# Patient Record
Sex: FEMALE | Race: OTHER | HISPANIC OR LATINO | URBAN - METROPOLITAN AREA
[De-identification: names, ages, dates, MRNs, and addresses within clinical notes are randomized per-mention and may not be internally consistent; named-entity substitution may affect disease eponyms.]

---

## 2019-02-15 ENCOUNTER — INPATIENT (INPATIENT)
Facility: HOSPITAL | Age: 29
LOS: 2 days | Discharge: HOME | End: 2019-02-18
Attending: OBSTETRICS & GYNECOLOGY | Admitting: OBSTETRICS & GYNECOLOGY
Payer: MEDICAID

## 2019-02-15 DIAGNOSIS — O26.899 OTHER SPECIFIED PREGNANCY RELATED CONDITIONS, UNSPECIFIED TRIMESTER: ICD-10-CM

## 2019-02-15 DIAGNOSIS — Z3A.00 WEEKS OF GESTATION OF PREGNANCY NOT SPECIFIED: ICD-10-CM

## 2019-02-16 VITALS — WEIGHT: 194.01 LBS | HEIGHT: 64 IN

## 2019-02-16 DIAGNOSIS — Z34.80 ENCOUNTER FOR SUPERVISION OF OTHER NORMAL PREGNANCY, UNSPECIFIED TRIMESTER: ICD-10-CM

## 2019-02-16 LAB
APTT BLD: 26.3 SEC — LOW (ref 27.5–36.3)
BASOPHILS # BLD AUTO: 0.01 K/UL — SIGNIFICANT CHANGE UP (ref 0–0.2)
BASOPHILS NFR BLD AUTO: 0.1 % — SIGNIFICANT CHANGE UP (ref 0–2)
EOSINOPHIL # BLD AUTO: 0 K/UL — SIGNIFICANT CHANGE UP (ref 0–0.5)
EOSINOPHIL NFR BLD AUTO: 0 % — SIGNIFICANT CHANGE UP (ref 0–6)
FIBRINOGEN PPP-MCNC: 934 MG/DL — HIGH (ref 350–510)
HBV SURFACE AG SERPL QL IA: SIGNIFICANT CHANGE UP
HCT VFR BLD CALC: 36.6 % — SIGNIFICANT CHANGE UP (ref 34.5–45)
HGB BLD-MCNC: 11.6 G/DL — SIGNIFICANT CHANGE UP (ref 11.5–15.5)
HIV 1 & 2 AB SERPL IA.RAPID: SIGNIFICANT CHANGE UP
HIV 1+2 AB+HIV1 P24 AG SERPL QL IA: SIGNIFICANT CHANGE UP
IMM GRANULOCYTES NFR BLD AUTO: 0.4 % — SIGNIFICANT CHANGE UP (ref 0–1.5)
INR BLD: 0.92 RATIO — SIGNIFICANT CHANGE UP (ref 0.88–1.16)
LYMPHOCYTES # BLD AUTO: 0.64 K/UL — LOW (ref 1–3.3)
LYMPHOCYTES # BLD AUTO: 7.8 % — LOW (ref 13–44)
MCHC RBC-ENTMCNC: 27.4 PG — SIGNIFICANT CHANGE UP (ref 27–34)
MCHC RBC-ENTMCNC: 31.7 GM/DL — LOW (ref 32–36)
MCV RBC AUTO: 86.3 FL — SIGNIFICANT CHANGE UP (ref 80–100)
MONOCYTES # BLD AUTO: 0.25 K/UL — SIGNIFICANT CHANGE UP (ref 0–0.9)
MONOCYTES NFR BLD AUTO: 3 % — SIGNIFICANT CHANGE UP (ref 2–14)
NEUTROPHILS # BLD AUTO: 7.27 K/UL — SIGNIFICANT CHANGE UP (ref 1.8–7.4)
NEUTROPHILS NFR BLD AUTO: 88.7 % — HIGH (ref 43–77)
NRBC # BLD: 0 /100 WBCS — SIGNIFICANT CHANGE UP (ref 0–0)
PLATELET # BLD AUTO: 227 K/UL — SIGNIFICANT CHANGE UP (ref 150–400)
PROTHROM AB SERPL-ACNC: 10.2 SEC — SIGNIFICANT CHANGE UP (ref 10–12.9)
RBC # BLD: 4.24 M/UL — SIGNIFICANT CHANGE UP (ref 3.8–5.2)
RBC # FLD: 13.9 % — SIGNIFICANT CHANGE UP (ref 10.3–14.5)
RUBV IGG SER-ACNC: 1.5 INDEX — SIGNIFICANT CHANGE UP
RUBV IGG SER-IMP: POSITIVE — SIGNIFICANT CHANGE UP
T PALLIDUM AB TITR SER: NEGATIVE — SIGNIFICANT CHANGE UP
WBC # BLD: 8.2 K/UL — SIGNIFICANT CHANGE UP (ref 3.8–10.5)
WBC # FLD AUTO: 8.2 K/UL — SIGNIFICANT CHANGE UP (ref 3.8–10.5)

## 2019-02-16 RX ORDER — SODIUM CHLORIDE 9 MG/ML
1000 INJECTION, SOLUTION INTRAVENOUS ONCE
Qty: 0 | Refills: 0 | Status: DISCONTINUED | OUTPATIENT
Start: 2019-02-16 | End: 2019-02-16

## 2019-02-16 RX ORDER — OXYCODONE AND ACETAMINOPHEN 5; 325 MG/1; MG/1
2 TABLET ORAL EVERY 6 HOURS
Qty: 0 | Refills: 0 | Status: DISCONTINUED | OUTPATIENT
Start: 2019-02-16 | End: 2019-02-18

## 2019-02-16 RX ORDER — SODIUM CHLORIDE 9 MG/ML
3 INJECTION INTRAMUSCULAR; INTRAVENOUS; SUBCUTANEOUS EVERY 8 HOURS
Qty: 0 | Refills: 0 | Status: DISCONTINUED | OUTPATIENT
Start: 2019-02-16 | End: 2019-02-18

## 2019-02-16 RX ORDER — LANOLIN
1 OINTMENT (GRAM) TOPICAL EVERY 6 HOURS
Qty: 0 | Refills: 0 | Status: DISCONTINUED | OUTPATIENT
Start: 2019-02-16 | End: 2019-02-18

## 2019-02-16 RX ORDER — AER TRAVELER 0.5 G/1
1 SOLUTION RECTAL; TOPICAL EVERY 4 HOURS
Qty: 0 | Refills: 0 | Status: DISCONTINUED | OUTPATIENT
Start: 2019-02-16 | End: 2019-02-18

## 2019-02-16 RX ORDER — MAGNESIUM HYDROXIDE 400 MG/1
30 TABLET, CHEWABLE ORAL
Qty: 0 | Refills: 0 | Status: DISCONTINUED | OUTPATIENT
Start: 2019-02-16 | End: 2019-02-17

## 2019-02-16 RX ORDER — SODIUM CHLORIDE 9 MG/ML
2000 INJECTION, SOLUTION INTRAVENOUS ONCE
Qty: 0 | Refills: 0 | Status: COMPLETED | OUTPATIENT
Start: 2019-02-16 | End: 2019-02-16

## 2019-02-16 RX ORDER — OXYCODONE AND ACETAMINOPHEN 5; 325 MG/1; MG/1
1 TABLET ORAL
Qty: 0 | Refills: 0 | Status: DISCONTINUED | OUTPATIENT
Start: 2019-02-16 | End: 2019-02-18

## 2019-02-16 RX ORDER — DIPHENHYDRAMINE HCL 50 MG
25 CAPSULE ORAL EVERY 6 HOURS
Qty: 0 | Refills: 0 | Status: DISCONTINUED | OUTPATIENT
Start: 2019-02-16 | End: 2019-02-18

## 2019-02-16 RX ORDER — DOCUSATE SODIUM 100 MG
100 CAPSULE ORAL
Qty: 0 | Refills: 0 | Status: DISCONTINUED | OUTPATIENT
Start: 2019-02-16 | End: 2019-02-18

## 2019-02-16 RX ORDER — PRAMOXINE HYDROCHLORIDE 150 MG/15G
1 AEROSOL, FOAM RECTAL EVERY 4 HOURS
Qty: 0 | Refills: 0 | Status: DISCONTINUED | OUTPATIENT
Start: 2019-02-16 | End: 2019-02-18

## 2019-02-16 RX ORDER — SODIUM CHLORIDE 9 MG/ML
1000 INJECTION, SOLUTION INTRAVENOUS
Qty: 0 | Refills: 0 | Status: DISCONTINUED | OUTPATIENT
Start: 2019-02-16 | End: 2019-02-16

## 2019-02-16 RX ORDER — TETANUS TOXOID, REDUCED DIPHTHERIA TOXOID AND ACELLULAR PERTUSSIS VACCINE, ADSORBED 5; 2.5; 8; 8; 2.5 [IU]/.5ML; [IU]/.5ML; UG/.5ML; UG/.5ML; UG/.5ML
0.5 SUSPENSION INTRAMUSCULAR ONCE
Qty: 0 | Refills: 0 | Status: DISCONTINUED | OUTPATIENT
Start: 2019-02-16 | End: 2019-02-18

## 2019-02-16 RX ORDER — SIMETHICONE 80 MG/1
80 TABLET, CHEWABLE ORAL EVERY 6 HOURS
Qty: 0 | Refills: 0 | Status: DISCONTINUED | OUTPATIENT
Start: 2019-02-16 | End: 2019-02-18

## 2019-02-16 RX ORDER — IBUPROFEN 200 MG
600 TABLET ORAL EVERY 6 HOURS
Qty: 0 | Refills: 0 | Status: DISCONTINUED | OUTPATIENT
Start: 2019-02-16 | End: 2019-02-18

## 2019-02-16 RX ORDER — HYDROCORTISONE 1 %
1 OINTMENT (GRAM) TOPICAL EVERY 4 HOURS
Qty: 0 | Refills: 0 | Status: DISCONTINUED | OUTPATIENT
Start: 2019-02-16 | End: 2019-02-18

## 2019-02-16 RX ORDER — OXYTOCIN 10 UNIT/ML
41.67 VIAL (ML) INJECTION
Qty: 20 | Refills: 0 | Status: DISCONTINUED | OUTPATIENT
Start: 2019-02-16 | End: 2019-02-18

## 2019-02-16 RX ORDER — OXYTOCIN 10 UNIT/ML
333.33 VIAL (ML) INJECTION
Qty: 20 | Refills: 0 | Status: DISCONTINUED | OUTPATIENT
Start: 2019-02-16 | End: 2019-02-16

## 2019-02-16 RX ORDER — CITRIC ACID/SODIUM CITRATE 300-500 MG
15 SOLUTION, ORAL ORAL EVERY 4 HOURS
Qty: 0 | Refills: 0 | Status: DISCONTINUED | OUTPATIENT
Start: 2019-02-16 | End: 2019-02-16

## 2019-02-16 RX ORDER — ACETAMINOPHEN 500 MG
650 TABLET ORAL EVERY 6 HOURS
Qty: 0 | Refills: 0 | Status: DISCONTINUED | OUTPATIENT
Start: 2019-02-16 | End: 2019-02-18

## 2019-02-16 RX ORDER — GLYCERIN ADULT
1 SUPPOSITORY, RECTAL RECTAL AT BEDTIME
Qty: 0 | Refills: 0 | Status: DISCONTINUED | OUTPATIENT
Start: 2019-02-16 | End: 2019-02-18

## 2019-02-16 RX ORDER — DIBUCAINE 1 %
1 OINTMENT (GRAM) RECTAL EVERY 4 HOURS
Qty: 0 | Refills: 0 | Status: DISCONTINUED | OUTPATIENT
Start: 2019-02-16 | End: 2019-02-18

## 2019-02-16 RX ADMIN — Medication 125 MILLIUNIT(S)/MIN: at 15:13

## 2019-02-16 RX ADMIN — SODIUM CHLORIDE 4000 MILLILITER(S): 9 INJECTION, SOLUTION INTRAVENOUS at 04:00

## 2019-02-16 RX ADMIN — SODIUM CHLORIDE 3 MILLILITER(S): 9 INJECTION INTRAMUSCULAR; INTRAVENOUS; SUBCUTANEOUS at 22:00

## 2019-02-16 NOTE — DISCHARGE NOTE OB - PATIENT PORTAL LINK FT
You can access the [facility] Patient Portal, offered by [facility], by registering with the following website: [email or web address]

## 2019-02-16 NOTE — DISCHARGE NOTE OB - CARE PROVIDER_API CALL
Margarette Mendez)  Obstetrics and Gynecology  [address] [place] [address] [address]  [place] [address]  Phone: [phone]  Fax: [phone]  Follow Up Time:     Rosa Bland)  Pediatrics  [address] [place] [address]  Phone: [phone]  Fax: [phone]  Follow Up Time:

## 2019-02-16 NOTE — DISCHARGE NOTE OB - MEDICATION SUMMARY - MEDICATIONS TO TAKE
I will START or STAY ON the medications listed below when I get home from the hospital:    ibuprofen 600 mg oral tablet  -- 1 tab(s) by mouth every 6 hours, As needed, Mild Pain (1 - 3)  -- Indication: For for pain    ferrous sulfate 325 mg (65 mg elemental iron) oral tablet  -- 1 tab(s) by mouth 3 times a day  -- Indication: For Acute blood loss anemia    Prenatal Multivitamins with Folic Acid 1 mg oral tablet  -- 1 tab(s) by mouth once a day  -- Indication: For continue daily while whole course of breastfeeding    docusate sodium 100 mg oral capsule  -- 1 cap(s) by mouth 2 times a day  -- Indication: For Stool softenr    senna oral tablet  -- 2 tab(s) by mouth once a day (at bedtime)  -- Indication: For for bowel movement

## 2019-02-16 NOTE — DISCHARGE NOTE OB - PLAN OF CARE
POSTPARTUM CARE ob check in 5weeks call office and set up appointment no sex nothing in vagina no heavy lifting no pushing no straining no strenuous activities  pain medication as needed; stool softener; dulcolax as needed if constipated  walk for exercise: helps recovery   continue prenatal vitamins daily especially whole course of breastfeeding  see your OB in the office for follow up post partum check 4-5weeks call the office to set up an appointment

## 2019-02-16 NOTE — DISCHARGE NOTE OB - CARE PLAN
Principal Discharge DX:	Labor abnormality  Goal:	  Assessment and plan of treatment:	POSTPARTUM CARE Principal Discharge DX:	Labor abnormality  Goal:	ob check in 5weeks call office and set up appointment  Assessment and plan of treatment:	no sex nothing in vagina no heavy lifting no pushing no straining no strenuous activities  pain medication as needed; stool softener; dulcolax as needed if constipated  walk for exercise: helps recovery   continue prenatal vitamins daily especially whole course of breastfeeding  see your OB in the office for follow up post partum check 4-5weeks call the office to set up an appointment

## 2019-02-16 NOTE — PATIENT PROFILE OB - NS PRO TDAP CONTRAINDICATIONS
Will follow up with PCP

## 2019-02-16 NOTE — DISCHARGE NOTE OB - MATERIALS PROVIDED
Guide to Postpartum Care/Breastfeeding Guide and Packet/Birth Certificate Instructions/[place]  Immunization Record/Bottle Feeding Log/Shaken Baby Prevention Handout/[facility] [place] Screening Program/Vaccinations/Breastfeeding Log

## 2019-02-17 DIAGNOSIS — D62 ACUTE POSTHEMORRHAGIC ANEMIA: ICD-10-CM

## 2019-02-17 LAB
BASOPHILS # BLD AUTO: 0.02 K/UL — SIGNIFICANT CHANGE UP (ref 0–0.2)
BASOPHILS NFR BLD AUTO: 0.2 % — SIGNIFICANT CHANGE UP (ref 0–2)
EOSINOPHIL # BLD AUTO: 0.01 K/UL — SIGNIFICANT CHANGE UP (ref 0–0.5)
EOSINOPHIL NFR BLD AUTO: 0.1 % — SIGNIFICANT CHANGE UP (ref 0–6)
HCT VFR BLD CALC: 26 % — LOW (ref 34.5–45)
HGB BLD-MCNC: 8.1 G/DL — LOW (ref 11.5–15.5)
IMM GRANULOCYTES NFR BLD AUTO: 0.4 % — SIGNIFICANT CHANGE UP (ref 0–1.5)
LYMPHOCYTES # BLD AUTO: 1.4 K/UL — SIGNIFICANT CHANGE UP (ref 1–3.3)
LYMPHOCYTES # BLD AUTO: 13.1 % — SIGNIFICANT CHANGE UP (ref 13–44)
MCHC RBC-ENTMCNC: 27.6 PG — SIGNIFICANT CHANGE UP (ref 27–34)
MCHC RBC-ENTMCNC: 31.2 GM/DL — LOW (ref 32–36)
MCV RBC AUTO: 88.4 FL — SIGNIFICANT CHANGE UP (ref 80–100)
MONOCYTES # BLD AUTO: 0.69 K/UL — SIGNIFICANT CHANGE UP (ref 0–0.9)
MONOCYTES NFR BLD AUTO: 6.5 % — SIGNIFICANT CHANGE UP (ref 2–14)
NEUTROPHILS # BLD AUTO: 8.51 K/UL — HIGH (ref 1.8–7.4)
NEUTROPHILS NFR BLD AUTO: 79.7 % — HIGH (ref 43–77)
NRBC # BLD: 0 /100 WBCS — SIGNIFICANT CHANGE UP (ref 0–0)
PLATELET # BLD AUTO: 149 K/UL — LOW (ref 150–400)
RBC # BLD: 2.94 M/UL — LOW (ref 3.8–5.2)
RBC # FLD: 14.3 % — SIGNIFICANT CHANGE UP (ref 10.3–14.5)
WBC # BLD: 10.67 K/UL — HIGH (ref 3.8–10.5)
WBC # FLD AUTO: 10.67 K/UL — HIGH (ref 3.8–10.5)

## 2019-02-17 RX ORDER — FERROUS SULFATE 325(65) MG
1 TABLET ORAL
Qty: 90 | Refills: 0 | OUTPATIENT
Start: 2019-02-17 | End: 2019-03-18

## 2019-02-17 RX ORDER — MAGNESIUM HYDROXIDE 400 MG/1
30 TABLET, CHEWABLE ORAL
Qty: 0 | Refills: 0 | Status: DISCONTINUED | OUTPATIENT
Start: 2019-02-17 | End: 2019-02-18

## 2019-02-17 RX ORDER — DOCUSATE SODIUM 100 MG
1 CAPSULE ORAL
Qty: 60 | Refills: 0 | OUTPATIENT
Start: 2019-02-17 | End: 2019-03-18

## 2019-02-17 RX ORDER — IBUPROFEN 200 MG
1 TABLET ORAL
Qty: 40 | Refills: 0
Start: 2019-02-17 | End: 2019-02-26

## 2019-02-17 RX ORDER — FERROUS SULFATE 325(65) MG
325 TABLET ORAL THREE TIMES A DAY
Qty: 0 | Refills: 0 | Status: DISCONTINUED | OUTPATIENT
Start: 2019-02-17 | End: 2019-02-18

## 2019-02-17 RX ORDER — SENNA PLUS 8.6 MG/1
2 TABLET ORAL AT BEDTIME
Qty: 0 | Refills: 0 | Status: DISCONTINUED | OUTPATIENT
Start: 2019-02-17 | End: 2019-02-18

## 2019-02-17 RX ORDER — SENNA PLUS 8.6 MG/1
2 TABLET ORAL
Qty: 60 | Refills: 0
Start: 2019-02-17 | End: 2019-03-18

## 2019-02-17 RX ADMIN — Medication 600 MILLIGRAM(S): at 17:54

## 2019-02-17 RX ADMIN — Medication 600 MILLIGRAM(S): at 03:29

## 2019-02-17 RX ADMIN — Medication 325 MILLIGRAM(S): at 13:10

## 2019-02-17 RX ADMIN — Medication 325 MILLIGRAM(S): at 22:10

## 2019-02-17 RX ADMIN — MAGNESIUM HYDROXIDE 30 MILLILITER(S): 400 TABLET, CHEWABLE ORAL at 17:54

## 2019-02-17 RX ADMIN — Medication 1 TABLET(S): at 13:10

## 2019-02-17 RX ADMIN — SENNA PLUS 2 TABLET(S): 8.6 TABLET ORAL at 22:10

## 2019-02-17 RX ADMIN — Medication 100 MILLIGRAM(S): at 17:54

## 2019-02-17 RX ADMIN — SODIUM CHLORIDE 3 MILLILITER(S): 9 INJECTION INTRAMUSCULAR; INTRAVENOUS; SUBCUTANEOUS at 13:13

## 2019-02-17 RX ADMIN — Medication 600 MILLIGRAM(S): at 18:28

## 2019-02-17 RX ADMIN — SODIUM CHLORIDE 3 MILLILITER(S): 9 INJECTION INTRAMUSCULAR; INTRAVENOUS; SUBCUTANEOUS at 22:14

## 2019-02-17 RX ADMIN — SODIUM CHLORIDE 3 MILLILITER(S): 9 INJECTION INTRAMUSCULAR; INTRAVENOUS; SUBCUTANEOUS at 06:31

## 2019-02-17 NOTE — PROGRESS NOTE ADULT - SUBJECTIVE AND OBJECTIVE BOX
PA NOTE:  PPd#1 s/p    in the room    Patient seen at bedside resting comfortably offers no current complaints. Ambulating and voiding without difficulty.  Passing flatus and tolerating regular diet.  both breast/bottle feeding . Denies HA, CP, SOB, N/V/D, dizziness, palpitations, worsening abdominal pain, worsening vaginal bleeding, or any other concerns.      Vital Signs Last 24 Hrs  T(C): 37 (2019 06:12), Max: 37.8 (2019 16:38)  T(F): 98.6 (2019 06:12), Max: 100 (2019 16:38)  HR: 78 (2019 06:12) (78 - 99)  BP: 102/65 (2019 06:12) (102/65 - 119/59)  BP(mean): 78 (2019 16:29) (73 - 84)  RR: 18 (2019 06:12) (17 - 22)  SpO2: 96% (2019 06:12) (96% - 100%)    Physical Exam:     Gen: A&Ox 3, NAD  Chest: CTA B/L  Cardiac: S1+S2; RRR  Breast: Soft, nontender, nonengorged  Abdomen: +Bs; Soft, nontender,  ND; Fundus firm below umbilicus  Gyn: mod lochia, intact/repaired  Ext: Nontender, DTRS 2+, no worsening edema                          8.1    10.67 )-----------( 149      ( 2019 07:00 )             26.0                   A/P:  PPD#1 s/p   uterine atony post delivery: methergine x1  acute blood loss anemia due to uterine atony: iron 3xday  -Continue pain management  -Encourage OOB and ambulation  -Continue current care  -Plan for discharge tomorrow  -d/w dr rasmussen in house ob on call

## 2019-02-17 NOTE — PROGRESS NOTE ADULT - PROBLEM SELECTOR PLAN 3
A/P:  PPD#1 s/p   uterine atony post delivery: methergine x1  acute blood loss anemia due to uterine atony: iron 3xday  -Continue pain management  -Encourage OOB and ambulation  -Continue current care  -Plan for discharge tomorrow  -d/w dr rasmussen in house ob on call

## 2019-02-18 VITALS
HEART RATE: 71 BPM | SYSTOLIC BLOOD PRESSURE: 102 MMHG | OXYGEN SATURATION: 98 % | RESPIRATION RATE: 16 BRPM | TEMPERATURE: 98 F | DIASTOLIC BLOOD PRESSURE: 65 MMHG

## 2019-02-18 LAB
ANION GAP SERPL CALC-SCNC: 8 MMOL/L — SIGNIFICANT CHANGE UP (ref 5–17)
APPEARANCE UR: CLEAR — SIGNIFICANT CHANGE UP
BILIRUB UR-MCNC: NEGATIVE — SIGNIFICANT CHANGE UP
BUN SERPL-MCNC: 6 MG/DL — LOW (ref 7–18)
CALCIUM SERPL-MCNC: 8.2 MG/DL — LOW (ref 8.4–10.5)
CHLORIDE SERPL-SCNC: 104 MMOL/L — SIGNIFICANT CHANGE UP (ref 96–108)
CO2 SERPL-SCNC: 25 MMOL/L — SIGNIFICANT CHANGE UP (ref 22–31)
COLOR SPEC: YELLOW — SIGNIFICANT CHANGE UP
CREAT SERPL-MCNC: 0.67 MG/DL — SIGNIFICANT CHANGE UP (ref 0.5–1.3)
DIFF PNL FLD: ABNORMAL
GLUCOSE SERPL-MCNC: 68 MG/DL — LOW (ref 70–99)
GLUCOSE UR QL: NEGATIVE — SIGNIFICANT CHANGE UP
KETONES UR-MCNC: NEGATIVE — SIGNIFICANT CHANGE UP
LEUKOCYTE ESTERASE UR-ACNC: NEGATIVE — SIGNIFICANT CHANGE UP
NITRITE UR-MCNC: NEGATIVE — SIGNIFICANT CHANGE UP
PH UR: 8 — SIGNIFICANT CHANGE UP (ref 5–8)
POTASSIUM SERPL-MCNC: 3.6 MMOL/L — SIGNIFICANT CHANGE UP (ref 3.5–5.3)
POTASSIUM SERPL-SCNC: 3.6 MMOL/L — SIGNIFICANT CHANGE UP (ref 3.5–5.3)
PROT UR-MCNC: NEGATIVE — SIGNIFICANT CHANGE UP
SODIUM SERPL-SCNC: 137 MMOL/L — SIGNIFICANT CHANGE UP (ref 135–145)
SP GR SPEC: 1.01 — SIGNIFICANT CHANGE UP (ref 1.01–1.02)
UROBILINOGEN FLD QL: 1

## 2019-02-18 PROCEDURE — 59025 FETAL NON-STRESS TEST: CPT

## 2019-02-18 PROCEDURE — 86901 BLOOD TYPING SEROLOGIC RH(D): CPT

## 2019-02-18 PROCEDURE — 36415 COLL VENOUS BLD VENIPUNCTURE: CPT

## 2019-02-18 PROCEDURE — 86900 BLOOD TYPING SEROLOGIC ABO: CPT

## 2019-02-18 PROCEDURE — 85384 FIBRINOGEN ACTIVITY: CPT

## 2019-02-18 PROCEDURE — 85027 COMPLETE CBC AUTOMATED: CPT

## 2019-02-18 PROCEDURE — 80048 BASIC METABOLIC PNL TOTAL CA: CPT

## 2019-02-18 PROCEDURE — 81001 URINALYSIS AUTO W/SCOPE: CPT

## 2019-02-18 PROCEDURE — 86850 RBC ANTIBODY SCREEN: CPT

## 2019-02-18 PROCEDURE — 87389 HIV-1 AG W/HIV-1&-2 AB AG IA: CPT

## 2019-02-18 PROCEDURE — 87340 HEPATITIS B SURFACE AG IA: CPT

## 2019-02-18 PROCEDURE — 86780 TREPONEMA PALLIDUM: CPT

## 2019-02-18 PROCEDURE — 86703 HIV-1/HIV-2 1 RESULT ANTBDY: CPT

## 2019-02-18 PROCEDURE — 59050 FETAL MONITOR W/REPORT: CPT

## 2019-02-18 PROCEDURE — 86762 RUBELLA ANTIBODY: CPT

## 2019-02-18 PROCEDURE — G0463: CPT

## 2019-02-18 PROCEDURE — 85610 PROTHROMBIN TIME: CPT

## 2019-02-18 PROCEDURE — 85730 THROMBOPLASTIN TIME PARTIAL: CPT

## 2019-02-18 PROCEDURE — 87086 URINE CULTURE/COLONY COUNT: CPT

## 2019-02-18 RX ADMIN — Medication 100 MILLIGRAM(S): at 06:17

## 2019-02-18 RX ADMIN — Medication 325 MILLIGRAM(S): at 06:17

## 2019-02-18 RX ADMIN — SODIUM CHLORIDE 3 MILLILITER(S): 9 INJECTION INTRAMUSCULAR; INTRAVENOUS; SUBCUTANEOUS at 06:16

## 2019-02-18 RX ADMIN — MAGNESIUM HYDROXIDE 30 MILLILITER(S): 400 TABLET, CHEWABLE ORAL at 06:17

## 2019-02-18 NOTE — PROGRESS NOTE ADULT - PROBLEM SELECTOR PLAN 1
- Discharge home with instructions  -Follow up in office in 5-6 weeks for postpartum visit  -Breastfeeding encouraged   -Iron TID/Colace  -Ibuprofen for pain  -d/w Dr. Gaines, [place] attending

## 2019-02-18 NOTE — PROGRESS NOTE ADULT - SUBJECTIVE AND OBJECTIVE BOX
Patient seen at bedside resting comfortably offers no current complaints.  Ambulating and voiding without difficulty.  Passing flatus and tolerating regular diet.  both breast/bottle feeding.  Denies CP, SOB, N/V/D, dizziness, palpitations, worsening abdominal pain, worsening vaginal bleeding, or any other concerns.      Vital Signs Last 24 Hrs  T(C): 36.6 (2019 05:58), Max: 36.7 (2019 19:56)  T(F): 97.8 (2019 05:58), Max: 98 (2019 19:56)  HR: 71 (2019 05:58) (71 - 92)  BP: 102/65 (2019 05:58) (102/65 - 107/71)  BP(mean): --  RR: 16 (2019 05:58) (16 - 16)  SpO2: 98% (2019 05:58) (98% - 98%)    Physical Exam:   Gen: A&Ox 3, NAD  Chest: CTA B/L  Cardiac: S1,S2; RRR  Abdomen: +BS; Soft, nontender, ND; Fundus firm below umbilicus  Gyn: Min lochia  Ext: Nontender, no worsening edema                          8.1    10.67 )-----------( 149      ( 2019 07:00 )             26.0        A/P: 27yo PPD#2 s/p . Acute blood loss anemia. Hemodynamically stable.    - Discharge home with instructions  -Follow up in office in 5-6 weeks for postpartum visit  -Breastfeeding encouraged   -Iron TID/Colace  -Ibuprofen for pain  -d/w Dr. Gaines, house attending

## 2019-02-18 NOTE — PROGRESS NOTE ADULT - PROBLEM SELECTOR PLAN 2
- Discharge home with instructions  -Follow up in office in 5-6 weeks for postpartum visit  -Breastfeeding encouraged   -Iron TID/Colace  -Ibuprofen for pain  -d/w Dr. Gaines, [place] attending

## 2019-02-19 LAB
CULTURE RESULTS: SIGNIFICANT CHANGE UP
SPECIMEN SOURCE: SIGNIFICANT CHANGE UP

## 2020-01-07 NOTE — PATIENT PROFILE OB - FALLEN IN THE PAST
Children under the age of 2 years will be restrained in a rear facing child safety seat.   If you have an active [facility] account, please look for your well child questionnaire to come to your [facility] account before your next well child visit.    Well-Baby Checkup: 2 Months     You may have noticed your baby smiling at the sound of your voice. This is called a social smile.     At the 2-month checkup, the healthcare provider will examine the baby and ask how things are going at home. This sheet describes some of what you can expect.  Development and milestones  The healthcare provider will ask questions about your baby. He or she will observe the baby to get an idea of the infants development. By this visit, your baby is likely doing some of the following:  · Smiling on purpose, such as in response to another person (called a social smile)  · Batting or swiping at nearby objects  · Following you with his or her eyes as you move around a room  · Beginning to lift or control his or her head  Feeding tips  Continue to feed your baby either breastmilk or formula. To help your baby eat well:  · During the day, feed at least every 2 to 3 hours. You may need to wake the baby for daytime feedings.  · At night, feed when the baby wakes, often every 3 to 4 hours. Its OK if the baby sleeps longer than this. You likely dont need to wake the baby for nighttime feedings.  · Breastfeeding sessions should last around 10 to 15 minutes. With a bottle, give your baby 4 to 6 ounces of breastmilk or formula.  · If youre concerned about how much or how often your baby eats, discuss this with the healthcare provider.  · Ask the healthcare provider if your baby should take vitamin D.  · Dont give your baby anything to eat besides breastmilk or formula. Your baby is too young for solid foods (solids) or other liquids. A young infant should not be given plain water.  · Be aware that many babies of 2 months spit up after  feeding. In most cases, this is normal. Call the healthcare provider right away if the baby spits up often and forcefully, or spits up anything besides milk or formula.   Hygiene tips  · Some babies poop (have bowel movements) a few times a day. Others poop as little as once every 2 to 3 days. Anything in this range is normal.  · Its fine if your baby poops even less often than every 2 to 3 days if the baby is otherwise healthy. But if the baby also becomes fussy, spits up more than normal, eats less than normal, or has very hard stool, tell the healthcare provider. The baby may be constipated (unable to have a bowel movement).  · Stool may range in color from mustard yellow to brown to green. If its another color, tell the healthcare provider.  · Bathe your baby a few times per week. You may give baths more often if the baby seems to like it. But because youre cleaning the baby during diaper changes, a daily bath often isnt needed.  · Its OK to use mild (hypoallergenic) creams or lotions on the babys skin. Don't put lotion on the babys hands.  Sleeping tips  At 2 months, most babies sleep around 15 to 18 hours each day. Its common to sleep for short spurts throughout the day, rather than for hours at a time. The baby may be fussy before going to bed for the night, around 6 p.m. to 9 p.m. This is normal. To help your baby sleep safely and soundly follow the tips below:  · Put your baby on his or her back for naps and sleeping until your child is 1 year old. This can lower the risk for SIDS, aspiration, and choking. Never put your baby on his or her side or stomach for sleep or naps. When your baby is awake, let your child spend time on his or her tummy as long as you are watching your child. This helps your child build strong tummy and neck muscles. This will also help keep your baby's head from flattening. This problem can happen when babies spend so much time on their back.  · Ask the healthcare provider  if you should let your baby sleep with a pacifier. Sleeping with a pacifier has been shown to decrease the risk for SIDS. But don't offer it until after breastfeeding has been established. If your baby doesnt want the pacifier, dont try to force him or her to take one.  · Dont put a crib bumper, pillow, loose blankets, or stuffed animals in the crib. These could suffocate the baby.  · Swaddling means wrapping your  baby snugly in a blanket, but with enough space so he or she can move hips and legs. Swaddling can help the baby feel safe and fall asleep. You can buy a special swaddling blanket designed to make swaddling easier. But dont use swaddling if your baby is 2 months or older, or if your baby can roll over on his or her own. Swaddling may raise the risk for SIDS (sudden infant death syndrome) if the swaddled baby rolls onto his or her stomach. Your baby's legs should be able to move up and out at the hips. Dont place your babys legs so that they are held together and straight down. This raises the risk that the hip joints wont grow and develop correctly. This can cause a problem called hip dysplasia and dislocation. Also be careful of swaddling your baby if the weather is warm or hot. Using a thick blanket in warm weather can make your baby overheat. Instead use a lighter blanket or sheet to swaddle the baby.   · Don't put your baby on a couch or armchair for sleep. Sleeping on a couch or armchair puts the baby at a much higher risk for death, including SIDS.  · Don't use infant seats, car seats, strollers, infant carriers, or infant swings for routine sleep and daily naps. These may cause a baby's airway to become blocked or the baby to suffocate.  · Its OK to put the baby to bed awake. Its also OK to let the baby cry in bed for a short time, but no longer than a few minutes. At this age babies arent ready to cry themselves to sleep.  · If you have trouble getting your baby to sleep, ask  the healthcare provider for tips.  · Don't share a bed (co-sleep) with your baby. Bed-sharing has been shown to increase the risk for SIDS. The American Academy of Pediatrics says that babies should sleep in the same room as their parents. They should be close to their parents' bed, but in a separate bed or crib. This sleeping setup should be done for the baby's first year, if possible. But you should do it for at least the first 6 months.  · Always put cribs, bassinets, and play yards in areas with no hazards. This means no dangling cords, wires, or window coverings. This will lower the risk for strangulation.  · Don't use baby heart rate and monitors or special devices to help lower the risk for SIDS. These devices include wedges, positioners, and special mattresses. These devices have not been shown to prevent SIDS. In rare cases, they have caused the death of a baby.  · Talk with your baby's healthcare provider about these and other health and safety issues.  Safety tips  · To avoid burns, dont carry or drink hot liquids, such as coffee or tea, near the baby. Turn the water heater down to a temperature of 120.0°F (49.0°C) or below.  · Dont smoke or allow others to smoke near the baby. If you or other family members smoke, do so outdoors while wearing a jacket, and then remove the jacket before holding the baby. Never smoke around the baby.  · Its fine to bring your baby out of the house. But stay away from confined, crowded places where germs can spread.  · When you take the baby outside, don't stay too long in direct sunlight. Keep the baby covered, or seek out the shade.  · In the car, always put the baby in a rear-facing car seat. This should be secured in the back seat according to the car seats directions. Never leave the baby alone in the car.  · Dont leave the baby on a high surface such as a table, bed, or couch. He or she could fall and get hurt. Also, dont place the baby in a bouncy seat on a  high surface.  · Older siblings can hold and play with the baby as long as an adult supervises.   · Call the healthcare provider right away if the baby is under 3 months of age and has a fever (see Fever and children below).     Fever and children  Always use a digital thermometer to check your childs temperature. Never use a mercury thermometer.  For infants and toddlers, be sure to use a rectal thermometer correctly. A rectal thermometer may accidentally poke a hole in (perforate) the rectum. It may also pass on germs from the stool. Always follow the product makers directions for proper use. If you dont feel comfortable taking a rectal temperature, use another method. When you talk to your childs healthcare provider, tell him or her which method you used to take your childs temperature.  Here are guidelines for fever temperature. Ear temperatures arent accurate before 6 months of age. Dont take an oral temperature until your child is at least 4 years old.  Infant under 3 months old:  · Ask your childs healthcare provider how you should take the temperature.  · Rectal or forehead (temporal artery) temperature of 100.4°F (38°C) or higher, or as directed by the provider  · Armpit temperature of 99°F (37.2°C) or higher, or as directed by the provider      Vaccines  Based on recommendations from the CDC, at this visit your baby may get the following vaccines:  · Diphtheria, tetanus, and pertussis  · Haemophilus influenzae type b  · Hepatitis B  · Pneumococcus  · Polio  · Rotavirus  Vaccines help keep your baby healthy  Vaccines (also called immunizations) help a babys body build up defenses against serious diseases. Having your baby fully vaccinated will also help lower your baby's risk for SIDS. Many are given in a series of doses. To be protected, your baby needs each dose at the right time. Many combination vaccines are available. These can help reduce the number of needlesticks needed to vaccinate your  baby against all of these important diseases. Talk with your child's healthcare provider about the benefits of vaccines and any risks they may have. Also ask what to do if your baby misses a dose. If this happens, your baby will need catch-up vaccines to be fully protected. After vaccines are given, some babies have mild side effects such as redness and swelling where the shot was given, fever, fussiness, or sleepiness. Talk with the provider about how to manage these.      Next checkup at: _______________________________     PARENT NOTES:  Date Last Reviewed: 11/1/2016  © 4841-8192 [employer]. [address] [place] [address], [place] [address]. All rights reserved. This information is not intended as a substitute for professional medical care. Always follow your healthcare professional's instructions.         no

## 2022-08-23 ENCOUNTER — INPATIENT (INPATIENT)
Facility: HOSPITAL | Age: 32
LOS: 0 days | Discharge: HOME | End: 2022-08-24
Attending: OBSTETRICS & GYNECOLOGY | Admitting: OBSTETRICS & GYNECOLOGY
Payer: MEDICAID

## 2022-08-23 VITALS — WEIGHT: 169.76 LBS | HEIGHT: 64 IN

## 2022-08-23 DIAGNOSIS — O26.899 OTHER SPECIFIED PREGNANCY RELATED CONDITIONS, UNSPECIFIED TRIMESTER: ICD-10-CM

## 2022-08-23 DIAGNOSIS — Z34.80 ENCOUNTER FOR SUPERVISION OF OTHER NORMAL PREGNANCY, UNSPECIFIED TRIMESTER: ICD-10-CM

## 2022-08-23 DIAGNOSIS — Z3A.00 WEEKS OF GESTATION OF PREGNANCY NOT SPECIFIED: ICD-10-CM

## 2022-08-23 LAB
APTT BLD: 25.6 SEC — LOW (ref 27.5–35.5)
BASOPHILS # BLD AUTO: 0.02 K/UL — SIGNIFICANT CHANGE UP (ref 0–0.2)
BASOPHILS NFR BLD AUTO: 0.3 % — SIGNIFICANT CHANGE UP (ref 0–2)
EOSINOPHIL # BLD AUTO: 0 K/UL — SIGNIFICANT CHANGE UP (ref 0–0.5)
EOSINOPHIL NFR BLD AUTO: 0 % — SIGNIFICANT CHANGE UP (ref 0–6)
FIBRINOGEN PPP-MCNC: 663 MG/DL — HIGH (ref 340–550)
HCT VFR BLD CALC: 30.6 % — LOW (ref 34.5–45)
HGB BLD-MCNC: 9.1 G/DL — LOW (ref 11.5–15.5)
IMM GRANULOCYTES NFR BLD AUTO: 0.4 % — SIGNIFICANT CHANGE UP (ref 0–1.5)
INR BLD: 0.93 RATIO — SIGNIFICANT CHANGE UP (ref 0.88–1.16)
LYMPHOCYTES # BLD AUTO: 0.8 K/UL — LOW (ref 1–3.3)
LYMPHOCYTES # BLD AUTO: 11.8 % — LOW (ref 13–44)
MCHC RBC-ENTMCNC: 22.2 PG — LOW (ref 27–34)
MCHC RBC-ENTMCNC: 29.7 GM/DL — LOW (ref 32–36)
MCV RBC AUTO: 74.8 FL — LOW (ref 80–100)
MONOCYTES # BLD AUTO: 0.25 K/UL — SIGNIFICANT CHANGE UP (ref 0–0.9)
MONOCYTES NFR BLD AUTO: 3.7 % — SIGNIFICANT CHANGE UP (ref 2–14)
NEUTROPHILS # BLD AUTO: 5.66 K/UL — SIGNIFICANT CHANGE UP (ref 1.8–7.4)
NEUTROPHILS NFR BLD AUTO: 83.8 % — HIGH (ref 43–77)
NRBC # BLD: 0 /100 WBCS — SIGNIFICANT CHANGE UP (ref 0–0)
PLATELET # BLD AUTO: 267 K/UL — SIGNIFICANT CHANGE UP (ref 150–400)
PROTHROM AB SERPL-ACNC: 11.1 SEC — SIGNIFICANT CHANGE UP (ref 10.5–13.4)
RBC # BLD: 4.09 M/UL — SIGNIFICANT CHANGE UP (ref 3.8–5.2)
RBC # FLD: 16.7 % — HIGH (ref 10.3–14.5)
SARS-COV-2 RNA SPEC QL NAA+PROBE: DETECTED
T PALLIDUM AB TITR SER: NEGATIVE — SIGNIFICANT CHANGE UP
WBC # BLD: 6.76 K/UL — SIGNIFICANT CHANGE UP (ref 3.8–10.5)
WBC # FLD AUTO: 6.76 K/UL — SIGNIFICANT CHANGE UP (ref 3.8–10.5)

## 2022-08-23 RX ORDER — DIBUCAINE 1 %
1 OINTMENT (GRAM) RECTAL EVERY 6 HOURS
Refills: 0 | Status: DISCONTINUED | OUTPATIENT
Start: 2022-08-23 | End: 2022-08-24

## 2022-08-23 RX ORDER — BENZOCAINE 10 %
1 GEL (GRAM) MUCOUS MEMBRANE EVERY 6 HOURS
Refills: 0 | Status: DISCONTINUED | OUTPATIENT
Start: 2022-08-23 | End: 2022-08-24

## 2022-08-23 RX ORDER — OXYCODONE HYDROCHLORIDE 5 MG/1
5 TABLET ORAL ONCE
Refills: 0 | Status: DISCONTINUED | OUTPATIENT
Start: 2022-08-23 | End: 2022-08-24

## 2022-08-23 RX ORDER — IBUPROFEN 200 MG
600 TABLET ORAL EVERY 6 HOURS
Refills: 0 | Status: DISCONTINUED | OUTPATIENT
Start: 2022-08-23 | End: 2022-08-24

## 2022-08-23 RX ORDER — OXYCODONE HYDROCHLORIDE 5 MG/1
5 TABLET ORAL
Refills: 0 | Status: DISCONTINUED | OUTPATIENT
Start: 2022-08-23 | End: 2022-08-24

## 2022-08-23 RX ORDER — OXYTOCIN 10 UNIT/ML
333.33 VIAL (ML) INJECTION
Qty: 20 | Refills: 0 | Status: DISCONTINUED | OUTPATIENT
Start: 2022-08-23 | End: 2022-08-24

## 2022-08-23 RX ORDER — CITRIC ACID/SODIUM CITRATE 300-500 MG
15 SOLUTION, ORAL ORAL EVERY 6 HOURS
Refills: 0 | Status: DISCONTINUED | OUTPATIENT
Start: 2022-08-23 | End: 2022-08-23

## 2022-08-23 RX ORDER — LANOLIN
1 OINTMENT (GRAM) TOPICAL EVERY 6 HOURS
Refills: 0 | Status: DISCONTINUED | OUTPATIENT
Start: 2022-08-23 | End: 2022-08-24

## 2022-08-23 RX ORDER — SODIUM CHLORIDE 9 MG/ML
1000 INJECTION, SOLUTION INTRAVENOUS
Refills: 0 | Status: DISCONTINUED | OUTPATIENT
Start: 2022-08-23 | End: 2022-08-24

## 2022-08-23 RX ORDER — ACETAMINOPHEN 500 MG
975 TABLET ORAL
Refills: 0 | Status: DISCONTINUED | OUTPATIENT
Start: 2022-08-23 | End: 2022-08-24

## 2022-08-23 RX ORDER — TETANUS TOXOID, REDUCED DIPHTHERIA TOXOID AND ACELLULAR PERTUSSIS VACCINE, ADSORBED 5; 2.5; 8; 8; 2.5 [IU]/.5ML; [IU]/.5ML; UG/.5ML; UG/.5ML; UG/.5ML
0.5 SUSPENSION INTRAMUSCULAR ONCE
Refills: 0 | Status: DISCONTINUED | OUTPATIENT
Start: 2022-08-23 | End: 2022-08-24

## 2022-08-23 RX ORDER — AER TRAVELER 0.5 G/1
1 SOLUTION RECTAL; TOPICAL EVERY 4 HOURS
Refills: 0 | Status: DISCONTINUED | OUTPATIENT
Start: 2022-08-23 | End: 2022-08-24

## 2022-08-23 RX ORDER — SODIUM CHLORIDE 9 MG/ML
3 INJECTION INTRAMUSCULAR; INTRAVENOUS; SUBCUTANEOUS EVERY 8 HOURS
Refills: 0 | Status: DISCONTINUED | OUTPATIENT
Start: 2022-08-23 | End: 2022-08-24

## 2022-08-23 RX ORDER — HYDROCORTISONE 1 %
1 OINTMENT (GRAM) TOPICAL EVERY 6 HOURS
Refills: 0 | Status: DISCONTINUED | OUTPATIENT
Start: 2022-08-23 | End: 2022-08-24

## 2022-08-23 RX ORDER — SODIUM CHLORIDE 9 MG/ML
1000 INJECTION, SOLUTION INTRAVENOUS
Refills: 0 | Status: DISCONTINUED | OUTPATIENT
Start: 2022-08-23 | End: 2022-08-23

## 2022-08-23 RX ORDER — KETOROLAC TROMETHAMINE 30 MG/ML
30 SYRINGE (ML) INJECTION ONCE
Refills: 0 | Status: DISCONTINUED | OUTPATIENT
Start: 2022-08-23 | End: 2022-08-24

## 2022-08-23 RX ORDER — DIPHENHYDRAMINE HCL 50 MG
25 CAPSULE ORAL EVERY 6 HOURS
Refills: 0 | Status: DISCONTINUED | OUTPATIENT
Start: 2022-08-23 | End: 2022-08-24

## 2022-08-23 RX ORDER — MAGNESIUM HYDROXIDE 400 MG/1
30 TABLET, CHEWABLE ORAL
Refills: 0 | Status: DISCONTINUED | OUTPATIENT
Start: 2022-08-23 | End: 2022-08-24

## 2022-08-23 RX ORDER — SIMETHICONE 80 MG/1
80 TABLET, CHEWABLE ORAL EVERY 4 HOURS
Refills: 0 | Status: DISCONTINUED | OUTPATIENT
Start: 2022-08-23 | End: 2022-08-24

## 2022-08-23 RX ORDER — CHLORHEXIDINE GLUCONATE 213 G/1000ML
1 SOLUTION TOPICAL ONCE
Refills: 0 | Status: DISCONTINUED | OUTPATIENT
Start: 2022-08-23 | End: 2022-08-23

## 2022-08-23 RX ORDER — OXYTOCIN 10 UNIT/ML
VIAL (ML) INJECTION
Qty: 30 | Refills: 0 | Status: DISCONTINUED | OUTPATIENT
Start: 2022-08-23 | End: 2022-08-23

## 2022-08-23 RX ORDER — PRAMOXINE HYDROCHLORIDE 150 MG/15G
1 AEROSOL, FOAM RECTAL EVERY 4 HOURS
Refills: 0 | Status: DISCONTINUED | OUTPATIENT
Start: 2022-08-23 | End: 2022-08-24

## 2022-08-23 RX ADMIN — SODIUM CHLORIDE 3 MILLILITER(S): 9 INJECTION INTRAMUSCULAR; INTRAVENOUS; SUBCUTANEOUS at 22:45

## 2022-08-23 RX ADMIN — Medication 1 TABLET(S): at 13:02

## 2022-08-23 RX ADMIN — Medication 1000 MILLIUNIT(S)/MIN: at 10:42

## 2022-08-23 RX ADMIN — Medication 0.2 MILLIGRAM(S): at 15:19

## 2022-08-23 RX ADMIN — Medication 975 MILLIGRAM(S): at 15:20

## 2022-08-23 RX ADMIN — SODIUM CHLORIDE 125 MILLILITER(S): 9 INJECTION, SOLUTION INTRAVENOUS at 04:50

## 2022-08-23 RX ADMIN — Medication 0.2 MILLIGRAM(S): at 22:36

## 2022-08-23 RX ADMIN — Medication 975 MILLIGRAM(S): at 09:32

## 2022-08-23 RX ADMIN — Medication 975 MILLIGRAM(S): at 10:15

## 2022-08-23 RX ADMIN — Medication 2 MILLIUNIT(S)/MIN: at 06:52

## 2022-08-23 RX ADMIN — Medication 1 SPRAY(S): at 15:20

## 2022-08-23 RX ADMIN — Medication 975 MILLIGRAM(S): at 16:10

## 2022-08-23 NOTE — CHART NOTE - NSCHARTNOTEFT_GEN_A_CORE
called to evaluate by THU medina to evaluate for bleeding  pt passed large clot, and miller is wet   fundus firm  pelvic small clots manually expressed  a/p extra pit  cytotec 1000mcg placed rectal  d/w dr.williams chávez

## 2022-08-23 NOTE — PATIENT PROFILE OB - BREASTFEEDING PROVIDES STABLE TEMPERATURE THROUGH SKIN TO SKIN CONTACT
Problem: Goal Outcome Summary  Goal: Goal Outcome Summary  Outcome: Improving  POD#2. AVSS, requiring 1.5L nasal cannula while sleeping to maintain sats >91%, otherwise on RA. Pt continue to report achy chest/epigastric pain with deep breaths/coughing and abdominal pain, managed well with Tylenol and Oxycodone. Full liquid diet, tolerating well. PEG clamped. Pt denies nausea, scheduled Zofran continues. PIV infusing TKO. Phos 2.1, replacement in progress, recheck tomorrow morning. Voiding spont, adequate UOP. Passing flatus, no BM yet. Lap sites with dermabond CDI ADELFO. Scheduled nebs, Pt using IS and acapella independently. PCDs on while in bed. UAL, steady on feet. Pt ambulating long distances in curiel.  at bedside, attentive and supportive. Continue with POC.     Addendum: Pt had a loose BM x1, team notified.    Statement Selected

## 2022-08-23 NOTE — PATIENT PROFILE OB - BABY A: MECONIUM PRESENT, DELIVERY
ED Female  HPI





- General


Chief complaint: Urogenital-Female


Stated complaint: VAGINAL IRRITATION


Time Seen by Provider: 10/30/19 21:14


Source: patient


Mode of arrival: Ambulatory


Limitations: No Limitations





- History of Present Illness


Initial comments: 





This is a 26-year-old female presents ED complaining of vaginal discharge and 

irritation times 1 week.  Patient states that she used some Monistat treatment 

with no relief.  Patient denies any ordered or vaginal bleeding.  She states 

that she recently went to her GYN and had STD testing which were negative.  

Patient states she's had these symptoms before and not being a bacterial 

infection.  She denies nausea, vomiting, abdominal pain, pelvic pain, dysuria.


MD Complaint: vaginal discharge


-: week(s) (1)


Are you Pregnant Now?: No


Associated Symptoms: vaginal discharge.  denies: vaginal bleeding, abdominal 

pain, dysuria





- Related Data


                                  Previous Rx's











 Medication  Instructions  Recorded  Last Taken  Type


 


Pnv,Calcium 72/Iron/Folic Acid 1 each PO QDAY #30 tablet 02/25/16 Unknown Rx





[Prenatal Vitamin with Low Iron]    


 


metroNIDAZOLE 0.75%(NF) [Metrogel 1 applicatio TP BID #1 tube 02/25/16 Unknown 

Rx





0.75% TOPICAL]    


 


Ferrous Sulfate [Feosol 325 MG tab] 325 mg PO BID #60 tablet 07/23/16 Unknown Rx


 


Ibuprofen [Motrin] 800 mg PO Q8HR PRN #30 tablet 07/23/16 Unknown Rx


 


Prenatal Vit Calc,Iron,Folic 1 each PO DAILY #30 tablet 07/23/16 Unknown Rx





[Prenatal Vitamins]    


 


Nitrofurantoin Mono/M-Cryst 100 mg PO Q12HR #14 capsule 10/30/19 Unknown Rx





[Macrobid CAP]    


 


metroNIDAZOLE [metroNIDAZOLE 1 applic VG QHS 5 Days #1 10/30/19 Unknown Rx





VAGINAL 0.75% gel] gel.w.appl   











                                    Allergies











Allergy/AdvReac Type Severity Reaction Status Date / Time


 


No Known Allergies Allergy   Verified 02/25/16 05:46














ED Review of Systems


ROS: 


Stated complaint: VAGINAL IRRITATION


Other details as noted in HPI





Comment: All other systems reviewed and negative





ED Past Medical Hx





- Past Medical History


Previous Medical History?: No


Hx Hypertension: No


Hx Congestive Heart Failure: No


Hx Diabetes: No


Hx Deep Vein Thrombosis: No


Hx Renal Disease: No


Hx Sickle Cell Disease: No


Hx Seizures: No


Hx Asthma: No


Hx COPD: No


Hx HIV: No





- Surgical History


Past Surgical History?: No





- Social History


Smoking Status: Never Smoker


Substance Use Type: None





- Medications


Home Medications: 


                                Home Medications











 Medication  Instructions  Recorded  Confirmed  Last Taken  Type


 


Pnv,Calcium 72/Iron/Folic Acid 1 each PO QDAY #30 tablet 02/25/16 07/23/16 

Unknown Rx





[Prenatal Vitamin with Low Iron]     


 


metroNIDAZOLE 0.75%(NF) [Metrogel 1 applicatio TP BID #1 tube 02/25/16 07/23/16 

Unknown Rx





0.75% TOPICAL]     


 


Ferrous Sulfate [Feosol 325 MG tab] 325 mg PO BID #60 tablet 07/23/16  Unknown 

Rx


 


Ibuprofen [Motrin] 800 mg PO Q8HR PRN #30 tablet 07/23/16  Unknown Rx


 


Prenatal Vit Calc,Iron,Folic 1 each PO DAILY #30 tablet 07/23/16  Unknown Rx





[Prenatal Vitamins]     


 


Nitrofurantoin Mono/M-Cryst 100 mg PO Q12HR #14 capsule 10/30/19  Unknown Rx





[Macrobid CAP]     


 


metroNIDAZOLE [metroNIDAZOLE 1 applic VG QHS 5 Days #1 10/30/19  Unknown Rx





VAGINAL 0.75% gel] gel.w.appl    














ED Physical Exam





- General


Limitations: No Limitations


General appearance: alert, in no apparent distress





- Head


Head exam: Present: atraumatic, normocephalic





- Eye


Eye exam: Present: normal appearance





- ENT


ENT exam: Present: mucous membranes moist





- Neck


Neck exam: Present: normal inspection





- Respiratory


Respiratory exam: Present: normal lung sounds bilaterally.  Absent: respiratory 

distress





- Cardiovascular


Cardiovascular Exam: Present: regular rate, normal rhythm.  Absent: systolic 

murmur, diastolic murmur, rubs, gallop





- GI/Abdominal


GI/Abdominal exam: Present: soft, normal bowel sounds





- Extremities Exam


Extremities exam: Present: normal inspection





- Back Exam


Back exam: Present: normal inspection





- Neurological Exam


Neurological exam: Present: alert, oriented X3





- Psychiatric


Psychiatric exam: Present: normal affect, normal mood





- Skin


Skin exam: Present: warm, dry, intact, normal color.  Absent: rash





ED Course





                                   Vital Signs











  10/30/19





  20:51


 


Temperature 98.4 F


 


Pulse Rate 69


 


Respiratory 18





Rate 


 


Blood Pressure 104/62


 


O2 Sat by Pulse 99





Oximetry 














ED Medical Decision Making





- Medical Decision Making





This is a 26-year-old female presents with vaginitis.


Urinalysis positive for UTI, pregnancy test negative


I discussed findings with the patient.


Patient received antibiotic treatment for UTI and MetroGel for vaginitis.


I discussed the patient to follow-up with her primary care within the week.


Vital signs are normal she is in no acute distress


Critical care attestation.: 


If time is entered above; I have spent that time in minutes in the direct care 

of this critically ill patient, excluding procedure time.








ED Disposition


Clinical Impression: 


 Vaginitis





Disposition: DC-01 TO HOME OR SELFCARE


Is pt being admited?: No


Does the pt Need Aspirin: No


Condition: Stable


Instructions:  Bacterial Vaginosis (ED)


Additional Instructions: 


Make sure to follow up with the primary care physician as discussed.


Take all your medications as you've been prescribed.


If you have any worsening symptoms or develop new symptoms please return to ED 

immediately.


Prescriptions: 


metroNIDAZOLE [metroNIDAZOLE VAGINAL 0.75% gel] 1 applic VG QHS 5 Days #1 

gel.w.appl


Nitrofurantoin Mono/M-Cryst [Macrobid CAP] 100 mg PO Q12HR #14 capsule


Referrals: 


PRIMARY CARE,MD [Primary Care Provider] - 3-5 Days


[facility] [Outside] - 3-5 Days


[facility] [Outside] - 3-5 Days


Forms:  Work/School Release Form(ED)


Time of Disposition: 23:50
no

## 2022-08-23 NOTE — CHART NOTE - NSCHARTNOTEFT_GEN_A_CORE
Nurse notified PA of pt saturated her pad 1hr after being changed  pt seen at bedside offers no complaints, denies dizziness, cp, sob, palpitation    vss    abd: soft/nt, fundus firm below umbilicus  pelvic: minimal lochia after being changed    a/p ppd #0 s/p  w delayed PPH  cont close monitor  will re-evaluate in 1hr  PO methergine course started  d/w dr Ariza present

## 2022-08-24 VITALS
RESPIRATION RATE: 17 BRPM | DIASTOLIC BLOOD PRESSURE: 74 MMHG | OXYGEN SATURATION: 99 % | TEMPERATURE: 98 F | SYSTOLIC BLOOD PRESSURE: 112 MMHG | HEART RATE: 67 BPM

## 2022-08-24 LAB
HCT VFR BLD CALC: 27.9 % — LOW (ref 34.5–45)
HGB BLD-MCNC: 8.3 G/DL — LOW (ref 11.5–15.5)
MCHC RBC-ENTMCNC: 22.4 PG — LOW (ref 27–34)
MCHC RBC-ENTMCNC: 29.7 GM/DL — LOW (ref 32–36)
MCV RBC AUTO: 75.4 FL — LOW (ref 80–100)
NRBC # BLD: 0 /100 WBCS — SIGNIFICANT CHANGE UP (ref 0–0)
PLATELET # BLD AUTO: 226 K/UL — SIGNIFICANT CHANGE UP (ref 150–400)
RBC # BLD: 3.7 M/UL — LOW (ref 3.8–5.2)
RBC # FLD: 16.9 % — HIGH (ref 10.3–14.5)
WBC # BLD: 6.83 K/UL — SIGNIFICANT CHANGE UP (ref 3.8–10.5)
WBC # FLD AUTO: 6.83 K/UL — SIGNIFICANT CHANGE UP (ref 3.8–10.5)

## 2022-08-24 PROCEDURE — 86850 RBC ANTIBODY SCREEN: CPT

## 2022-08-24 PROCEDURE — 59050 FETAL MONITOR W/REPORT: CPT

## 2022-08-24 PROCEDURE — G0463: CPT

## 2022-08-24 PROCEDURE — 86900 BLOOD TYPING SEROLOGIC ABO: CPT

## 2022-08-24 PROCEDURE — 36415 COLL VENOUS BLD VENIPUNCTURE: CPT

## 2022-08-24 PROCEDURE — 86780 TREPONEMA PALLIDUM: CPT

## 2022-08-24 PROCEDURE — 87635 SARS-COV-2 COVID-19 AMP PRB: CPT

## 2022-08-24 PROCEDURE — 85027 COMPLETE CBC AUTOMATED: CPT

## 2022-08-24 PROCEDURE — 85384 FIBRINOGEN ACTIVITY: CPT

## 2022-08-24 PROCEDURE — 59025 FETAL NON-STRESS TEST: CPT

## 2022-08-24 PROCEDURE — 86901 BLOOD TYPING SEROLOGIC RH(D): CPT

## 2022-08-24 PROCEDURE — 85610 PROTHROMBIN TIME: CPT

## 2022-08-24 PROCEDURE — 85730 THROMBOPLASTIN TIME PARTIAL: CPT

## 2022-08-24 PROCEDURE — 85025 COMPLETE CBC W/AUTO DIFF WBC: CPT

## 2022-08-24 RX ORDER — IBUPROFEN 200 MG
3 TABLET ORAL
Qty: 0 | Refills: 0 | DISCHARGE
Start: 2022-08-24

## 2022-08-24 RX ADMIN — SODIUM CHLORIDE 3 MILLILITER(S): 9 INJECTION INTRAMUSCULAR; INTRAVENOUS; SUBCUTANEOUS at 06:35

## 2022-08-24 RX ADMIN — Medication 0.2 MILLIGRAM(S): at 02:34

## 2022-08-24 RX ADMIN — Medication 975 MILLIGRAM(S): at 03:04

## 2022-08-24 RX ADMIN — Medication 1 TABLET(S): at 11:48

## 2022-08-24 RX ADMIN — Medication 0.2 MILLIGRAM(S): at 11:48

## 2022-08-24 RX ADMIN — Medication 975 MILLIGRAM(S): at 02:34

## 2022-08-24 NOTE — DISCHARGE NOTE OB - MEDICATION SUMMARY - MEDICATIONS TO TAKE
I will START or STAY ON the medications listed below when I get home from the hospital:    ibuprofen 200 mg oral tablet  -- 3 tab(s) by mouth every 6 hours, As Needed  -- Indication: For pain    Prenatal Multivitamins with Folic Acid 1 mg oral tablet  -- 1 tab(s) by mouth once a day  -- Indication: For vitamin    ferrous sulfate 325 mg (65 mg elemental iron) oral tablet  -- 1 tab(s) by mouth 3 times a day  -- Indication: For anemia    docusate sodium 100 mg oral capsule  -- 1 cap(s) by mouth 2 times a day  -- Indication: For Stool softener   I will START or STAY ON the medications listed below when I get home from the hospital:    ibuprofen 200 mg oral tablet  -- 3 tab(s) by mouth every 6 hours, As Needed  -- Indication: For pain as needed    Prenatal Multivitamins with Folic Acid 1 mg oral tablet  -- 1 tab(s) by mouth once a day  -- Indication: For if breastfeeding    ferrous sulfate 325 mg (65 mg elemental iron) oral tablet  -- 1 tab(s) by mouth 3 times a day  -- Indication: For anemia    docusate sodium 100 mg oral capsule  -- 1 cap(s) by mouth 2 times a day  -- Indication: For Stool softner

## 2022-08-24 NOTE — DISCHARGE NOTE OB - CARE PLAN
Principal Discharge DX:	Normal spontaneous vaginal delivery  Assessment and plan of treatment:	routine vaginal delivery care, no tub baths, douching or sex for 6weeks, ambulate daily  Pt to make an appointment for 5-6 weeks at OB office for her Post Partum check up  Secondary Diagnosis:	Exposure to COVID-19 virus  Assessment and plan of treatment:	precautions reviewed  Secondary Diagnosis:	COVID-19 virus IgM antibody detected  Assessment and plan of treatment:	must wear mask for 10 days total   1

## 2022-08-24 NOTE — DISCHARGE NOTE OB - CARE PROVIDER_API CALL
Margarette Mendez  OBSTETRICS AND GYNECOLOGY  [address] [place] [address], [address]  [place] [address]  Phone: [phone]  Fax: [phone]  Established Patient  Follow Up Time: Routine

## 2022-08-24 NOTE — DISCHARGE NOTE OB - BREASTFEEDING PROVIDES STABLE TEMPERATURE THROUGH SKIN TO SKIN CONTACT
-- DO NOT REPLY / DO NOT REPLY ALL --  -- Message is from the [facility]--    Patient is requesting a medication refill - medication is on active medication list    Patient is currently OUT of the requested medication.    Was Medication Pended?  Yes.    Rx Name and Dose:  carbidopa-levodopa (SINEMET)  MG per tablet    Duration: 90 days    Pharmacy  [facility] - [place], [place] - [address]    Patient confirmed the above pharmacy as correct?  Yes    Does this request need an existing or new prescription at a pharmacy to be sent to a new pharmacy location?   No    Caller Information       Type Contact Phone    04/15/2022 11:24 AM CDT Phone (Incoming) Harvinder Moses (Self) [phone] (M)          Alternative phone number: n/a    Turnaround time given to caller:   \"This message will be sent to [state Provider's name]. The clinical team will fulfill your request as soon as they review your message.\"  
Statement Selected

## 2022-08-24 NOTE — DISCHARGE NOTE OB - PATIENT PORTAL LINK FT
You can access the FollowMyHealth Patient Portal offered by [facility] by registering at the following website: [email or web address]. By joining [employer]’s FollowMyHealth portal, you will also be able to view your health information using other applications (apps) compatible with our system.

## 2022-08-24 NOTE — PROGRESS NOTE ADULT - SUBJECTIVE AND OBJECTIVE BOX
Patient seen at bedside resting comfortably offers no current complaints. Ambulating and voiding without difficulty.  Passing flatus and tolerating regular diet.  both breast/bottle feeding . Denies HA, CP, SOB, N/V/D, dizziness, palpitations, worsening abdominal pain, worsening vaginal bleeding, or any other concerns.  Pt desires to go home today.   Vital Signs Last 24 Hrs  T(C): 36.6 (24 Aug 2022 04:46), Max: 37.2 (23 Aug 2022 11:41)  T(F): 97.9 (24 Aug 2022 04:46), Max: 99 (23 Aug 2022 11:41)  HR: 67 (24 Aug 2022 04:46) (67 - 98)  BP: 112/74 (24 Aug 2022 04:46) (103/63 - 126/65)  BP(mean): 77 (23 Aug 2022 12:14) (75 - 85)  RR: 17 (24 Aug 2022 04:46) (15 - 18)  SpO2: 99% (24 Aug 2022 04:46) (98% - 100%)    Parameters below as of 24 Aug 2022 04:46  Patient On (Oxygen Delivery Method): room air        Physical Exam:     Gen: A&Ox 3, NAD  Chest: CTA B/L  Cardiac: S1,S2; RRR  Breast: Soft, nontender, nonengorged  Abdomen: +BS, Soft, nontender,  ND; Fundus firm below umbilicus  Gyn: mod lochia, intact/repaired  Ext: Nontender, DTRS 2+, no worsening edema                          8.3    6.83  )-----------( 226      ( 24 Aug 2022 07:12 )             27.9       A/P:  PPD#1 s/p     -Continue pain management  -Encourage OOB and ambulation  -Check CBC  -Continue current care  -Plan for discharge today  - Discharge instructions reviewed with patient   -d/w dr Knapp

## 2022-08-24 NOTE — DISCHARGE NOTE OB - PLAN OF CARE
precautions reviewed routine vaginal delivery care, no tub baths, douching or sex for 6weeks, ambulate daily  Pt to make an appointment for 5-6 weeks at OB office for her Post Partum check up must wear mask for 10 days total

## 2022-08-24 NOTE — DISCHARGE NOTE OB - HOSPITAL COURSE
pt admitted in labor found to be COVID positive asymptomatic. Pt had a vaginal delivery followed by post partum hemorrhage. Pt received pitocin/ cytotec  and a methergine coruse.   Pt was discharged on PP day 1 per request.  Pt has chronic anemia with acute blood loss.

## 2023-11-07 NOTE — PATIENT PROFILE OB - AMNIOTIC FLUID ODOR, LABOR
Aklief counseling:  Patient advised to apply a pea-sized amount only at bedtime and wait 30 minutes after washing their face before applying.  If too drying, patient may add a non-comedogenic moisturizer.  The most commonly reported side effects including irritation, redness, scaling, dryness, stinging, burning, itching, and increased risk of sunburn.  The patient verbalized understanding of the proper use and possible adverse effects of retinoids.  All of the patient's questions and concerns were addressed. see cpn

## 2024-07-23 NOTE — PROGRESS NOTE ADULT - PROBLEM/PLAN-1
Chaperone present during sensitive exam: Yes. Name of chaperone: CLIFFORD Aburto.                                                                          DISPLAY PLAN FREE TEXT